# Patient Record
Sex: FEMALE | URBAN - METROPOLITAN AREA
[De-identification: names, ages, dates, MRNs, and addresses within clinical notes are randomized per-mention and may not be internally consistent; named-entity substitution may affect disease eponyms.]

---

## 2020-10-30 ENCOUNTER — APPOINTMENT (OUTPATIENT)
Dept: URBAN - METROPOLITAN AREA CLINIC 186 | Age: 35
Setting detail: DERMATOLOGY
End: 2020-10-30

## 2020-10-30 VITALS — WEIGHT: 190 LBS | TEMPERATURE: 98.2 F | HEIGHT: 70 IN

## 2020-10-30 DIAGNOSIS — L30.9 DERMATITIS, UNSPECIFIED: ICD-10-CM

## 2020-10-30 DIAGNOSIS — L71.0 PERIORAL DERMATITIS: ICD-10-CM

## 2020-10-30 PROCEDURE — OTHER COUNSELING: OTHER

## 2020-10-30 PROCEDURE — OTHER PRESCRIPTION: OTHER

## 2020-10-30 PROCEDURE — 99202 OFFICE O/P NEW SF 15 MIN: CPT

## 2020-10-30 PROCEDURE — OTHER TREATMENT REGIMEN: OTHER

## 2020-10-30 PROCEDURE — OTHER DIAGNOSIS COMMENT: OTHER

## 2020-10-30 RX ORDER — PIMECROLIMUS 10 MG/G
CREAM TOPICAL BID
Qty: 1 | Refills: 1 | Status: ERX | COMMUNITY
Start: 2020-10-30

## 2020-10-30 ASSESSMENT — LOCATION SIMPLE DESCRIPTION DERM
LOCATION SIMPLE: RIGHT LIP
LOCATION SIMPLE: RIGHT EAR
LOCATION SIMPLE: LEFT NOSE
LOCATION SIMPLE: LEFT EAR
LOCATION SIMPLE: LEFT RING FINGER

## 2020-10-30 ASSESSMENT — LOCATION DETAILED DESCRIPTION DERM
LOCATION DETAILED: LEFT PROXIMAL DORSAL RING FINGER
LOCATION DETAILED: RIGHT ANTERIOR EARLOBE
LOCATION DETAILED: LEFT ANTERIOR EARLOBE
LOCATION DETAILED: RIGHT UPPER CUTANEOUS LIP
LOCATION DETAILED: LEFT NASAL ALAR GROOVE

## 2020-10-30 ASSESSMENT — LOCATION ZONE DERM
LOCATION ZONE: NOSE
LOCATION ZONE: EAR
LOCATION ZONE: FINGER
LOCATION ZONE: LIP

## 2020-10-30 NOTE — HPI: RASH
What Type Of Note Output Would You Prefer (Optional)?: Bullet Format
How Severe Is Your Rash?: mild
Is This A New Presentation, Or A Follow-Up?: Rash
Additional History: Currently breastfeeding

## 2020-10-30 NOTE — PROCEDURE: COUNSELING
Patient Specific Counseling (Will Not Stick From Patient To Patient): Discussed with patient that the nickel in her jewelry may be causing her rash on finger and ears
Detail Level: Zone

## 2020-10-30 NOTE — PROCEDURE: TREATMENT REGIMEN
Otc Regimen: Cetaphil cleansers and moisturizer
Initiate Treatment: Elidel cream twice daily to affected areas
Detail Level: Zone
Discontinue Regimen: Acne treatments

## 2020-10-30 NOTE — PROCEDURE: DIAGNOSIS COMMENT
Comment: Patient describes a rash around her ear ring and wedding ring.  No rash visible today. Discussed from history would consider acd to nickel or icd.  Discussed both at length. Discussed  and moisturizer. If flares to revisit for re-evaluation
Detail Level: Zone
Comment: Discussed to stop topical steroid. Discussed with patient may have flare due to steroids.  Discussed topical and oral treatments. Patient is currently breast feeding. Will start elidel. Discussed if does not respond may need to add oral at next visit.

## 2021-02-02 ENCOUNTER — APPOINTMENT (OUTPATIENT)
Dept: URBAN - METROPOLITAN AREA CLINIC 186 | Age: 36
Setting detail: DERMATOLOGY
End: 2021-02-02

## 2021-02-02 DIAGNOSIS — L71.0 PERIORAL DERMATITIS: ICD-10-CM

## 2021-02-02 PROCEDURE — OTHER DIAGNOSIS COMMENT: OTHER

## 2021-02-02 PROCEDURE — OTHER COUNSELING: OTHER

## 2021-02-02 PROCEDURE — OTHER MIPS QUALITY: OTHER

## 2021-02-02 PROCEDURE — OTHER TREATMENT REGIMEN: OTHER

## 2021-02-02 PROCEDURE — 99213 OFFICE O/P EST LOW 20 MIN: CPT

## 2021-02-02 ASSESSMENT — LOCATION ZONE DERM
LOCATION ZONE: LIP
LOCATION ZONE: NOSE
LOCATION ZONE: EYELID

## 2021-02-02 ASSESSMENT — LOCATION SIMPLE DESCRIPTION DERM
LOCATION SIMPLE: RIGHT EYELID
LOCATION SIMPLE: LEFT EYELID
LOCATION SIMPLE: LEFT NOSE
LOCATION SIMPLE: RIGHT LIP

## 2021-02-02 ASSESSMENT — LOCATION DETAILED DESCRIPTION DERM
LOCATION DETAILED: LEFT NASAL ALAR GROOVE
LOCATION DETAILED: RIGHT LATERAL CANTHUS
LOCATION DETAILED: LEFT LATERAL CANTHUS
LOCATION DETAILED: RIGHT UPPER CUTANEOUS LIP

## 2021-02-02 NOTE — PROCEDURE: TREATMENT REGIMEN
Otc Regimen: Cetaphil cleansers and moisturizer
Detail Level: Zone
Continue Regimen: Elidel twice daily
Plan: Discussed adding oral/topical medications to treatment regimen. Counseled patient on the risks to children while taking oral medications and breastfeeding

## 2021-02-02 NOTE — HPI: SKIN LESION
What Type Of Note Output Would You Prefer (Optional)?: Bullet Format
How Severe Is Your Skin Lesion?: mild
Has Your Skin Lesion Been Treated?: not been treated
Is This A New Presentation, Or A Follow-Up?: Skin Lesion
Additional History: Pt believes it may be related to dermatitis on the mouth

## 2021-02-02 NOTE — PROCEDURE: DIAGNOSIS COMMENT
Detail Level: Zone
Comment: Patient with little change from elidel.  She is still nursing. Discussed condition and expectations. Discussed switching to a non fluronated toothpaste.  Discussed adding oracea or amzeeq when she stops breast feeding. Also discussed using a Zithromax now but discussed risks of resistance. Patient states she may be with in 4-6 weeks if stopping breast feeding. Will continue elidel for now
Render Risk Assessment In Note?: no

## 2021-06-01 ENCOUNTER — RX ONLY (RX ONLY)
Age: 36
End: 2021-06-01

## 2021-06-01 RX ORDER — DOXYCYCLINE 40 MG/1
1 CAPSULE CAPSULE ORAL DAILY
Qty: 30 | Refills: 2 | Status: CANCELLED
Stop reason: ENTERED-IN-ERROR